# Patient Record
Sex: MALE
[De-identification: names, ages, dates, MRNs, and addresses within clinical notes are randomized per-mention and may not be internally consistent; named-entity substitution may affect disease eponyms.]

---

## 2021-08-03 ENCOUNTER — TRANSCRIPTION ENCOUNTER (OUTPATIENT)
Age: 71
End: 2021-08-03

## 2022-01-23 ENCOUNTER — TRANSCRIPTION ENCOUNTER (OUTPATIENT)
Age: 72
End: 2022-01-23

## 2022-05-02 ENCOUNTER — APPOINTMENT (OUTPATIENT)
Dept: ORTHOPEDIC SURGERY | Facility: CLINIC | Age: 72
End: 2022-05-02
Payer: MEDICARE

## 2022-05-02 VITALS — WEIGHT: 230 LBS | HEIGHT: 71 IN | BODY MASS INDEX: 32.2 KG/M2

## 2022-05-02 DIAGNOSIS — M25.462 EFFUSION, LEFT KNEE: ICD-10-CM

## 2022-05-02 DIAGNOSIS — M17.12 UNILATERAL PRIMARY OSTEOARTHRITIS, LEFT KNEE: ICD-10-CM

## 2022-05-02 DIAGNOSIS — M17.11 UNILATERAL PRIMARY OSTEOARTHRITIS, RIGHT KNEE: ICD-10-CM

## 2022-05-02 PROBLEM — Z00.00 ENCOUNTER FOR PREVENTIVE HEALTH EXAMINATION: Status: ACTIVE | Noted: 2022-05-02

## 2022-05-02 PROCEDURE — 99213 OFFICE O/P EST LOW 20 MIN: CPT | Mod: 25

## 2022-05-02 PROCEDURE — 20610 DRAIN/INJ JOINT/BURSA W/O US: CPT

## 2022-05-02 NOTE — HISTORY OF PRESENT ILLNESS
[de-identified] : Patient Complaint - 3/17/22- Requesting repeat CSIs both knees\par 12/20/21- Requesting injections both knees\par 9/24/21- Requesting cortisone injections both knees\par 8/30/21- For Orthovisc #4 b/l knees\par 8/23/21- For Orthovisc #3 b/l knees\par 8/5/21=- For Orthovisc #1 b/l knees [] : no [FreeTextEntry1] : b/l knees  [FreeTextEntry5] : patient is following up after cortisone injs. Pt reported that his knees felt great for about 5 weeks after his last visit.

## 2022-05-02 NOTE — PHYSICAL EXAM
[Left] : left knee [Right] : right knee [] : medial joint line tenderness [5___] : hamstring 5[unfilled]/5 [Equivocal] : equivocal Roland [TWNoteComboBox7] : flexion 120 degrees [de-identified] : extension 0 degrees

## 2022-05-02 NOTE — PROCEDURE
[Large Joint Injection] : Large joint injection [Left] : of the left [Knee] : knee [Pain] : pain [Alcohol] : alcohol [Betadine] : betadine [Ethyl Chloride sprayed topically] : ethyl chloride sprayed topically [___ cc    3mg] :  Betamethasone (Celestone) ~Vcc of 3mg [___ cc    1%] : Lidocaine ~Vcc of 1%  [Effusion] : effusion [de-identified] : 45cc left [de-identified] : clear

## 2022-09-28 ENCOUNTER — NON-APPOINTMENT (OUTPATIENT)
Age: 72
End: 2022-09-28

## 2022-09-29 ENCOUNTER — TRANSCRIPTION ENCOUNTER (OUTPATIENT)
Age: 72
End: 2022-09-29

## 2022-09-30 ENCOUNTER — APPOINTMENT (OUTPATIENT)
Dept: DISASTER EMERGENCY | Facility: HOSPITAL | Age: 72
End: 2022-09-30

## 2023-04-12 ENCOUNTER — APPOINTMENT (OUTPATIENT)
Dept: ORTHOPEDIC SURGERY | Facility: CLINIC | Age: 73
End: 2023-04-12
Payer: MEDICARE

## 2023-04-12 VITALS — WEIGHT: 230 LBS | BODY MASS INDEX: 32.2 KG/M2 | HEIGHT: 71 IN

## 2023-04-12 DIAGNOSIS — M19.011 PRIMARY OSTEOARTHRITIS, RIGHT SHOULDER: ICD-10-CM

## 2023-04-12 PROCEDURE — 73010 X-RAY EXAM OF SHOULDER BLADE: CPT | Mod: RT

## 2023-04-12 PROCEDURE — 99213 OFFICE O/P EST LOW 20 MIN: CPT | Mod: 25

## 2023-04-12 PROCEDURE — J3490M: CUSTOM | Mod: NC

## 2023-04-12 PROCEDURE — 20611 DRAIN/INJ JOINT/BURSA W/US: CPT | Mod: RT

## 2023-04-12 PROCEDURE — 73030 X-RAY EXAM OF SHOULDER: CPT | Mod: RT

## 2023-04-12 NOTE — IMAGING
[de-identified] : No swelling, no ecchymosis, no deformity, no scapular winging.\par Posterior TTP\par Forward flexion to 160; external rotation to 60 degrees (with shoulder abducted); internal rotation to 20 degrees (with shoulder abducted); there is pain with ROM testing\par 5-/5 supraspinatus, infraspinatus and subscapularis\par Negative Zapien test, negative impingement sign\par Motor and sensory intact distally\par  [Right] : right shoulder [Glenohumeral arthritis] : Glenohumeral arthritis [There are no fractures, subluxations or dislocations. No significant abnormalities are seen] : There are no fractures, subluxations or dislocations. No significant abnormalities are seen

## 2023-04-12 NOTE — HISTORY OF PRESENT ILLNESS
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

## 2023-04-12 NOTE — PROCEDURE
[FreeTextEntry3] : -Large joint injection was performed of the right shoulder glenohumeral joint\par -The indication for this procedure was pain, inflammation and x-ray evidence of Osteoarthritis on this or prior visit. The site was prepped with alcohol, betadine, ethyl chloride sprayed topically and sterile technique used. \par -An injection of Betamethasone 2cc, , Marcaine 5cc was used.  \par -Patient was advised to call if redness, pain or fever occur, apply ice for 15 minutes out of every hour for the next 12-24 hours as tolerated and patient was advised to rest the joint(s) for 2 days. Patient has tried OTC's including aspirin, Ibuprofen, Aleve, etc or prescription NSAIDS, and/or exercises at home and/or physical therapy without satisfactory response, patient had decreased mobility in the joint and the risks benefits, and alternatives have been discussed, and verbal consent was obtained. \par -Ultrasound guidance was indicated for this patient for glenohumeral injection. All ultrasound images have been permanently captured and stored accordingly in our picture archiving and communication system. Visualization of the needle and placement of injection was performed without complication.\par

## 2023-04-12 NOTE — ASSESSMENT
[FreeTextEntry1] : Exacerbation of chronic right shoulder pain.  He has had a long history with the shoulder and prior scope with Bipin in 2005.  X-rays today demonstrating advanced glenohumeral arthritis.  His symptoms do not appear debilitating enough to warrant arthroplasty.  He reports good relief with the injection a few years back.  Glenohumeral injection is planned today.  Advised if see bipin in 6 weeks, can consider a subacromial injection if no relief.

## 2023-05-01 ENCOUNTER — APPOINTMENT (OUTPATIENT)
Dept: ORTHOPEDIC SURGERY | Facility: CLINIC | Age: 73
End: 2023-05-01

## 2024-07-31 ENCOUNTER — NON-APPOINTMENT (OUTPATIENT)
Age: 74
End: 2024-07-31